# Patient Record
Sex: MALE
[De-identification: names, ages, dates, MRNs, and addresses within clinical notes are randomized per-mention and may not be internally consistent; named-entity substitution may affect disease eponyms.]

---

## 2017-06-02 ENCOUNTER — HOSPITAL ENCOUNTER (OUTPATIENT)
Dept: HOSPITAL 5 - MRI | Age: 82
Discharge: HOME | End: 2017-06-02
Attending: INTERNAL MEDICINE
Payer: MEDICARE

## 2017-06-02 DIAGNOSIS — R42: Primary | ICD-10-CM

## 2017-06-02 LAB — BUN SERPL-MCNC: 18 MG/DL (ref 9–20)

## 2017-06-02 PROCEDURE — A9577 INJ MULTIHANCE: HCPCS

## 2017-06-02 PROCEDURE — 84520 ASSAY OF UREA NITROGEN: CPT

## 2017-06-02 PROCEDURE — 82565 ASSAY OF CREATININE: CPT

## 2017-06-02 PROCEDURE — 70553 MRI BRAIN STEM W/O & W/DYE: CPT

## 2017-06-02 PROCEDURE — 36415 COLL VENOUS BLD VENIPUNCTURE: CPT

## 2020-08-18 ENCOUNTER — WEB ENCOUNTER (OUTPATIENT)
Dept: URBAN - METROPOLITAN AREA CLINIC 70 | Facility: CLINIC | Age: 85
End: 2020-08-18

## 2020-08-18 ENCOUNTER — OFFICE VISIT (OUTPATIENT)
Dept: URBAN - METROPOLITAN AREA CLINIC 70 | Facility: CLINIC | Age: 85
End: 2020-08-18
Payer: MEDICARE

## 2020-08-18 DIAGNOSIS — K58.8 OTHER IRRITABLE BOWEL SYNDROME: ICD-10-CM

## 2020-08-18 DIAGNOSIS — K22.4 ESOPHAGEAL DYSMOTILITY: ICD-10-CM

## 2020-08-18 PROCEDURE — G8420 CALC BMI NORM PARAMETERS: HCPCS | Performed by: NURSE PRACTITIONER

## 2020-08-18 PROCEDURE — G8427 DOCREV CUR MEDS BY ELIG CLIN: HCPCS | Performed by: NURSE PRACTITIONER

## 2020-08-18 PROCEDURE — 99213 OFFICE O/P EST LOW 20 MIN: CPT | Performed by: NURSE PRACTITIONER

## 2020-08-18 PROCEDURE — 1036F TOBACCO NON-USER: CPT | Performed by: NURSE PRACTITIONER

## 2020-08-18 RX ORDER — ESZOPICLONE 2 MG
TAKE 1 TABLET (2 MG) BY ORAL ROUTE ONCE DAILY AT BEDTIME TABLET ORAL 1
Qty: 0 | Refills: 0 | Status: ACTIVE | COMMUNITY
Start: 1900-01-01

## 2020-08-18 RX ORDER — ASPIRIN 81 MG/1
CHEW 1 TABLET (81 MG) BY ORAL ROUTE ONCE DAILY TABLET, CHEWABLE ORAL 1
Qty: 0 | Refills: 0 | Status: ACTIVE | COMMUNITY
Start: 1900-01-01

## 2020-08-18 RX ORDER — GABAPENTIN 100 MG/1
TAKE 1 CAPSULE BY ORAL ROUTE 2 TIMES A DAY CAPSULE ORAL 2
Qty: 0 | Refills: 0 | Status: ACTIVE | COMMUNITY
Start: 1900-01-01

## 2020-08-18 RX ORDER — CLOPIDOGREL BISULFATE 75 MG
TAKE 1 TABLET (75 MG) BY ORAL ROUTE ONCE DAILY TABLET ORAL 1
Qty: 0 | Refills: 0 | Status: ACTIVE | COMMUNITY
Start: 1900-01-01

## 2020-08-18 RX ORDER — TRAMADOL HYDROCHLORIDE 50 MG/1
PRN TABLET ORAL
Qty: 0 | Refills: 0 | Status: ACTIVE | COMMUNITY
Start: 1900-01-01

## 2020-08-18 RX ORDER — FINASTERIDE 5 MG/1
TAKE 1 TABLET (5 MG) BY ORAL ROUTE ONCE DAILY TABLET, FILM COATED ORAL 1
Qty: 0 | Refills: 0 | Status: ACTIVE | COMMUNITY
Start: 1900-01-01

## 2020-08-18 RX ORDER — TAMSULOSIN HYDROCHLORIDE 0.4 MG/1
CAPSULE ORAL
Qty: 0 | Refills: 0 | Status: ACTIVE | COMMUNITY
Start: 1900-01-01

## 2020-08-18 RX ORDER — HYOSCYAMINE SULFATE 0.12 MG/1
1 TABLET UNDER THE TONGUE AND ALLOW TO DISSOLVE  AS NEEDED TABLET, ORALLY DISINTEGRATING ORAL
Qty: 270 | Refills: 1 | OUTPATIENT
Start: 2020-08-18 | End: 2021-02-13

## 2020-08-18 NOTE — PHYSICAL EXAM CONSTITUTIONAL:
Problem: Pain, Chronic (Adult)  Goal: Identify Related Risk Factors and Signs and Symptoms  Outcome: Ongoing (interventions implemented as appropriate)    Goal: Acceptable Pain/Comfort Level and Functional Ability  Outcome: Ongoing (interventions implemented as appropriate)      Problem: Fall Risk (Adult)  Goal: Identify Related Risk Factors and Signs and Symptoms  Outcome: Ongoing (interventions implemented as appropriate)    Goal: Absence of Fall  Outcome: Ongoing (interventions implemented as appropriate)      Problem: Skin Injury Risk (Adult)  Goal: Identify Related Risk Factors and Signs and Symptoms  Outcome: Ongoing (interventions implemented as appropriate)    Goal: Skin Health and Integrity  Outcome: Ongoing (interventions implemented as appropriate)         well developed, well nourished , in no acute distress , ambulating without difficulty , normal communication ability

## 2020-08-18 NOTE — HPI-TODAY'S VISIT:
Presents for follow up.  Has hx of esophageal dysmotility.  UGI series on 5/22/19 for c/o dysphagia:  mild to moderate diffuse esophageal spasms, moderate dysmotility and small HH.  Started on Altoid Peppermints TID AC to help improve dysphagia like c/o likely due to spams.  Voiced relief in symptoms.  Unable to proceed with EGD in 05/2019 due to recent diagnosis of CVA.    Also has a hx of IBS and remains on Levsin prn and Miralax daily.  Overall, denies any GI complaints at present.

## 2023-02-16 ENCOUNTER — LAB OUTSIDE AN ENCOUNTER (OUTPATIENT)
Dept: URBAN - METROPOLITAN AREA CLINIC 70 | Facility: CLINIC | Age: 88
End: 2023-02-16

## 2023-02-16 ENCOUNTER — OFFICE VISIT (OUTPATIENT)
Dept: URBAN - METROPOLITAN AREA CLINIC 70 | Facility: CLINIC | Age: 88
End: 2023-02-16
Payer: MEDICARE

## 2023-02-16 VITALS
DIASTOLIC BLOOD PRESSURE: 75 MMHG | HEIGHT: 72 IN | SYSTOLIC BLOOD PRESSURE: 149 MMHG | TEMPERATURE: 97.9 F | WEIGHT: 179.2 LBS | HEART RATE: 85 BPM | BODY MASS INDEX: 24.27 KG/M2

## 2023-02-16 DIAGNOSIS — K22.4 ESOPHAGEAL DYSMOTILITY: ICD-10-CM

## 2023-02-16 DIAGNOSIS — R10.33 PERIUMBILICAL ABDOMINAL PAIN: ICD-10-CM

## 2023-02-16 DIAGNOSIS — K58.8 OTHER IRRITABLE BOWEL SYNDROME: ICD-10-CM

## 2023-02-16 PROBLEM — 10743008: Status: ACTIVE | Noted: 2020-08-18

## 2023-02-16 PROBLEM — 266434009 ESOPHAGEAL DYSMOTILITY: Status: ACTIVE | Noted: 2023-02-16

## 2023-02-16 PROCEDURE — 99214 OFFICE O/P EST MOD 30 MIN: CPT | Performed by: NURSE PRACTITIONER

## 2023-02-16 RX ORDER — ALUMINUM ZIRCONIUM OCTACHLOROHYDREX GLY 16 G/100G
AS DIRECTED GEL TOPICAL
Status: ACTIVE | COMMUNITY

## 2023-02-16 RX ORDER — CLOPIDOGREL BISULFATE 75 MG
TAKE 1 TABLET (75 MG) BY ORAL ROUTE ONCE DAILY TABLET ORAL 1
Qty: 0 | Refills: 0 | Status: ACTIVE | COMMUNITY
Start: 1900-01-01

## 2023-02-16 RX ORDER — TAMSULOSIN HYDROCHLORIDE 0.4 MG/1
CAPSULE ORAL
Qty: 0 | Refills: 0 | Status: ACTIVE | COMMUNITY
Start: 1900-01-01

## 2023-02-16 RX ORDER — FINASTERIDE 5 MG/1
TAKE 1 TABLET (5 MG) BY ORAL ROUTE ONCE DAILY TABLET, FILM COATED ORAL 1
Qty: 0 | Refills: 0 | Status: ACTIVE | COMMUNITY
Start: 1900-01-01

## 2023-02-16 RX ORDER — GABAPENTIN 100 MG/1
TAKE 1 CAPSULE BY ORAL ROUTE 2 TIMES A DAY CAPSULE ORAL 2
Qty: 0 | Refills: 0 | Status: ACTIVE | COMMUNITY
Start: 1900-01-01

## 2023-02-16 RX ORDER — TRAMADOL HYDROCHLORIDE 50 MG/1
PRN TABLET ORAL
Qty: 0 | Refills: 0 | Status: ACTIVE | COMMUNITY
Start: 1900-01-01

## 2023-02-16 RX ORDER — ASPIRIN 81 MG/1
CHEW 1 TABLET (81 MG) BY ORAL ROUTE ONCE DAILY TABLET, CHEWABLE ORAL 1
Qty: 0 | Refills: 0 | Status: ACTIVE | COMMUNITY
Start: 1900-01-01

## 2023-02-16 RX ORDER — ESZOPICLONE 2 MG
TAKE 1 TABLET (2 MG) BY ORAL ROUTE ONCE DAILY AT BEDTIME TABLET ORAL 1
Qty: 0 | Refills: 0 | Status: ACTIVE | COMMUNITY
Start: 1900-01-01

## 2023-02-16 NOTE — HPI-TODAY'S VISIT:
OV 8/18/20 Rozina Gilmore NP: Presents for follow up.  Has hx of esophageal dysmotility.  UGI series on 5/22/19 for c/o dysphagia:  mild to moderate diffuse esophageal spasms, moderate dysmotility and small HH.  Started on Altoid Peppermints TID AC to help improve dysphagia like c/o likely due to spams.  Voiced relief in symptoms.  Unable to proceed with EGD in 05/2019 due to recent diagnosis of CVA.    Also has a hx of IBS and remains on Levsin prn and Miralax daily.  Overall, denies any GI complaints at present. ----------------------------------------------------- Today 2/16/23: Patient presents today accompanied by his wife for evaluation of abdominal pain. He reports intermittent mid abdominal pain that is chronic (many years). Pain is described as short in duration with sometimes feeling like a cramp and other times sharp. No exacerbating factors. Improved with antispasm medication PRN. 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 He takes miralax daily to every other day with constipation resolved. No associated factors. Denies fever, N/V, wt loss, diarrhea, or signs of GI bleeding.

## 2023-02-17 LAB
BUN/CREATININE RATIO: (no result)
BUN: 17
CREATININE: 0.91
EGFR: 78

## 2023-02-20 ENCOUNTER — TELEPHONE ENCOUNTER (OUTPATIENT)
Dept: URBAN - METROPOLITAN AREA CLINIC 23 | Facility: CLINIC | Age: 88
End: 2023-02-20

## 2023-03-16 ENCOUNTER — OFFICE VISIT (OUTPATIENT)
Dept: URBAN - METROPOLITAN AREA CLINIC 70 | Facility: CLINIC | Age: 88
End: 2023-03-16
Payer: MEDICARE

## 2023-03-16 ENCOUNTER — DASHBOARD ENCOUNTERS (OUTPATIENT)
Age: 88
End: 2023-03-16

## 2023-03-16 VITALS
DIASTOLIC BLOOD PRESSURE: 71 MMHG | HEART RATE: 91 BPM | TEMPERATURE: 98 F | HEIGHT: 72 IN | BODY MASS INDEX: 23.84 KG/M2 | SYSTOLIC BLOOD PRESSURE: 141 MMHG | WEIGHT: 176 LBS

## 2023-03-16 DIAGNOSIS — K58.8 OTHER IRRITABLE BOWEL SYNDROME: ICD-10-CM

## 2023-03-16 DIAGNOSIS — R10.33 PERIUMBILICAL ABDOMINAL PAIN: ICD-10-CM

## 2023-03-16 DIAGNOSIS — K22.4 ESOPHAGEAL DYSMOTILITY: ICD-10-CM

## 2023-03-16 PROCEDURE — 99214 OFFICE O/P EST MOD 30 MIN: CPT | Performed by: NURSE PRACTITIONER

## 2023-03-16 RX ORDER — ALUMINUM ZIRCONIUM OCTACHLOROHYDREX GLY 16 G/100G
AS DIRECTED GEL TOPICAL
Status: ACTIVE | COMMUNITY

## 2023-03-16 RX ORDER — CLOPIDOGREL BISULFATE 75 MG
TAKE 1 TABLET (75 MG) BY ORAL ROUTE ONCE DAILY TABLET ORAL 1
Qty: 0 | Refills: 0 | Status: ACTIVE | COMMUNITY
Start: 1900-01-01

## 2023-03-16 RX ORDER — GABAPENTIN 100 MG/1
TAKE 1 CAPSULE BY ORAL ROUTE 2 TIMES A DAY CAPSULE ORAL 2
Qty: 0 | Refills: 0 | Status: ACTIVE | COMMUNITY
Start: 1900-01-01

## 2023-03-16 RX ORDER — ASPIRIN 81 MG/1
CHEW 1 TABLET (81 MG) BY ORAL ROUTE ONCE DAILY TABLET, CHEWABLE ORAL 1
Qty: 0 | Refills: 0 | Status: ACTIVE | COMMUNITY
Start: 1900-01-01

## 2023-03-16 RX ORDER — FINASTERIDE 5 MG/1
TAKE 1 TABLET (5 MG) BY ORAL ROUTE ONCE DAILY TABLET, FILM COATED ORAL 1
Qty: 0 | Refills: 0 | Status: ACTIVE | COMMUNITY
Start: 1900-01-01

## 2023-03-16 RX ORDER — ESZOPICLONE 2 MG
TAKE 1 TABLET (2 MG) BY ORAL ROUTE ONCE DAILY AT BEDTIME TABLET ORAL 1
Qty: 0 | Refills: 0 | Status: ACTIVE | COMMUNITY
Start: 1900-01-01

## 2023-03-16 RX ORDER — TRAMADOL HYDROCHLORIDE 50 MG/1
PRN TABLET ORAL
Qty: 0 | Refills: 0 | Status: ACTIVE | COMMUNITY
Start: 1900-01-01

## 2023-03-16 RX ORDER — TAMSULOSIN HYDROCHLORIDE 0.4 MG/1
CAPSULE ORAL
Qty: 0 | Refills: 0 | Status: ACTIVE | COMMUNITY
Start: 1900-01-01

## 2023-03-16 NOTE — HPI-TODAY'S VISIT:
OV 8/18/20 Rozina Gilmore NP: Presents for follow up.  Has hx of esophageal dysmotility.  UGI series on 5/22/19 for c/o dysphagia:  mild to moderate diffuse esophageal spasms, moderate dysmotility and small HH.  Started on Altoid Peppermints TID AC to help improve dysphagia like c/o likely due to spams.  Voiced relief in symptoms.  Unable to proceed with EGD in 05/2019 due to recent diagnosis of CVA.    Also has a hx of IBS and remains on Levsin prn and Miralax daily.  Overall, denies any GI complaints at present. ----------------------------------------------------- OV 2/16/23: Patient presents today accompanied by his wife for evaluation of abdominal pain. He reports intermittent mid abdominal pain that is chronic (many years). Pain is described as short in duration with sometimes feeling like a cramp and other times sharp. No exacerbating factors. Improved with antispasm medication PRN. 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 He takes miralax daily to every other day with constipation resolved. No associated factors. Denies fever, N/V, wt loss, diarrhea, or signs of GI bleeding. ---------------------------------------------------- Today 3/16/23: Patient presents today for follow up. Abdominal CT 2/23/23 showed no acute GI process (diverticulosis w/o evidence of diverticulitis; atherosclerosis, and degenerative spine disease). Results discussed with patient. He reports feeling betters. States he believes the pain is radiating from his lower back. Has a pending spinal injection. No current or new GI complaints.